# Patient Record
(demographics unavailable — no encounter records)

---

## 2025-06-06 NOTE — ASSESSMENT
[FreeTextEntry1] : EXAM Right middle finger with mild swelling at PIPj, ski intact. +ttp at PIPj. Able to gently flex and extend at MCP, PIP and DIP with no rotational deformity. Sensation intact at radial and ulnar pulp. <2sec cap refill.  Right middle finger radiographs with middle phalanx volar base avulsion fracture (3-view)  ASSESSMENT/PLAN Right middle finger middle phalanx volar base avulsion fracture - reviewed radiographs and pathoanatomy with patient/parent. Discussed the current alignment both radiographically and clinically are within acceptable parameters to manage nonoperatively. Will manage in coban hector tape, ROM permitted. Elevate, minimize use. Ease into use.  Acute complicated injury - Risk of pain, stiffness, malunion, nonunion, rotational malalignment, post-traumatic arthrosis, and displacement requiring further intervention.  F/u 6week; reassess

## 2025-06-06 NOTE — HISTORY OF PRESENT ILLNESS
[de-identified] : Age: 13 year M PMHx: None. Hand Dominance: RHD Chief Complaint: Right middle finger, history of fracture 3/21/25. His father admits taking his son to [facility] 3/21/25 where they splinted the right middle finger and was referred to orthopedics but his father admits not following thru with ortho because he felt the finger was healed. He states [facility] would not clear him for gym so  now they need a note for school/gym clearance. Trauma: Patient reports playing basketball when he jumped for the ball and hyper flexed the right middle finger. Outside Imaging/Treatment: [facility], radiographs were performed and gave him a brace.  OTC Medications: No. OT/PT: No. Bracing: Yes. Pain worse with: n/a Pain better with: n/a